# Patient Record
Sex: FEMALE | Race: WHITE | NOT HISPANIC OR LATINO | Employment: OTHER | URBAN - METROPOLITAN AREA
[De-identification: names, ages, dates, MRNs, and addresses within clinical notes are randomized per-mention and may not be internally consistent; named-entity substitution may affect disease eponyms.]

---

## 2017-05-31 ENCOUNTER — THERAPY VISIT (OUTPATIENT)
Dept: PHYSICAL THERAPY | Facility: CLINIC | Age: 62
End: 2017-05-31
Payer: MEDICARE

## 2017-05-31 DIAGNOSIS — G89.29 CHRONIC BILATERAL LOW BACK PAIN WITHOUT SCIATICA: Primary | ICD-10-CM

## 2017-05-31 DIAGNOSIS — M54.50 CHRONIC BILATERAL LOW BACK PAIN WITHOUT SCIATICA: Primary | ICD-10-CM

## 2017-05-31 PROCEDURE — G8979 MOBILITY GOAL STATUS: HCPCS | Mod: GP | Performed by: PHYSICAL THERAPIST

## 2017-05-31 PROCEDURE — 97112 NEUROMUSCULAR REEDUCATION: CPT | Mod: GP | Performed by: PHYSICAL THERAPIST

## 2017-05-31 PROCEDURE — 97110 THERAPEUTIC EXERCISES: CPT | Mod: GP | Performed by: PHYSICAL THERAPIST

## 2017-05-31 PROCEDURE — 97161 PT EVAL LOW COMPLEX 20 MIN: CPT | Mod: GP | Performed by: PHYSICAL THERAPIST

## 2017-05-31 PROCEDURE — G8978 MOBILITY CURRENT STATUS: HCPCS | Mod: GP | Performed by: PHYSICAL THERAPIST

## 2017-05-31 NOTE — LETTER
DEPARTMENT OF HEALTH AND HUMAN SERVICES  CENTERS FOR MEDICARE & MEDICAID SERVICES    PLAN/UPDATED PLAN OF PROGRESS FOR OUTPATIENT REHABILITATION    PATIENTS NAME:  Estrella Arthur   : 1955    PROVIDER NUMBER:    [id number]  [id number]     PROVIDER NAME: [place] [facility]  MEDICAL RECORD NUMBER: [id number]     START OF CARE DATE:  SOC Date: 17   TYPE:  PT    PRIMARY/TREATMENT DIAGNOSIS: (Pertinent Medical Diagnosis)  Chronic bilateral low back pain without sciatica  VISITS FROM START OF CARE:  Rxs Used: 1     [place] [facility] Initial Evaluation -- Lumbar  Date: May 31, 2017  Estrella Arthur is a 61 year old female with a lumbar spine condition.   Referral: GP  Work mechanical stresses:   for granddaughter  Employment status:  1-2 days/week  Leisure mechanical stresses: Sedentary, no formal exercise  Functional disability score (OPAL/STarT Back):  See OPAL/Brittny flows  VAS score (0-10): 5/10  Patient goals/expectations:  Manage pain  HISTORY:  Present symptoms: (B) low back/lateral hips  Pain quality (sharp/shooting/stabbing/aching/burning/cramping):  Achy/sharp   Paresthesia (yes/no):  No  Present since (onset date): 4 months (2017).     Symptoms (improving/unchanging/worsening):  Improving but recently unchanging.   Symptoms commenced as a result of: Fell down flight of stairs   Condition occurred in the following environment:   Home   Symptoms at onset (back/thigh/leg): Back  Constant symptoms (back/thigh/leg): None  Intermittent symptoms (back/thigh/leg): Back  Symptoms are made worse with the following:  Always Bending,  Always Rising,  Always Standing and Time of day -  No effect  Symptoms are made better with the following:  Always Walking and  Always On the move  Disturbed sleep (yes/no):  Yes   Sleeping postures (prone/sup/side R/L): Supine  Previous episodes (0/1-5/6-10/11+): 2   Year of first episode:   Previous history:  Yes  Previous treatments: PT, epidural injections with previous episodes  PATIENTS NAME:  Estrella Arthur   : 1955  PRIMARY/TREATMENT DIAGNOSIS: (Pertinent Medical Diagnosis)  Chronic bilateral low back pain without sciatica    Specific Questions:  Cough/Sneeze/Strain (pos/neg): Pos  Bowel/Bladder (normal/abnormal): Normal  Gait (normal/abnormal): Normal  Medications (nil/NSAIDS/analg/steroids/anticoag/other):  Thyroid, bone density, neurontin  Medical allergies:  None  General health (excellent/good/fair/poor):  Fair  Pertinent medical history:  Thyroid problems, migraines/headaches, Smoking  Imaging (NA/Xray/MRI): X-ray, MRI   Recent or major surgery (yes/no):  None  Night pain (yes/no): No  Accidents (yes/no): No  Unexplained weight loss (yes/no): No  Barriers at home: None  Other red flags: None    EXAMINATION    Posture:   Sitting (good/fair/poor): Fair  Standing (good/fair/poor):  Fair  Lordosis (red/acc/normal): Reduced  Correction of posture (better/worse/no effect): Better  Lateral Shift (right/left/nil): Nil  Relevant (yes/no):  No  Other Observations: NA    Neurological:    Motor deficit:  NA  Reflexes: NA   Sensory deficit: NA   Dural signs:  Neg slump (B)    Movement Loss:   Gallo Mod Min Nil Pain   Flexion    X Low back   Extension  X      Side Gliding R   X  Low back   Side Gliding L   X  Low back     Test Movements:   During: produces, abolishes, increases, decreases, no effect, centralizing, peripheralizing   After: better, worse, no better, no worse, no effect, centralized, peripheralized      PATIENTS NAME:  Estrella Arthur   : 1955  PRIMARY/TREATMENT DIAGNOSIS: (Pertinent Medical Diagnosis)  Chronic bilateral low back pain without sciatica    Pretest symptoms standin/10 pain   Symptoms During Symptoms After ROM increased ROM decreased No Effect   FIS        Rep FIS        EIS        Rep EIS        Pretest symptoms lying:  (B) low back   Symptoms During Symptoms After ROM increased ROM  decreased No Effect   TYRESE        Rep TYRESE        EIL Increases No Worse      Rep EIL Decreases Better X     If required, pretest symptoms:    Symptoms During Symptoms After ROM increased ROM decreased No Effect   SGIS - R        Rep SGIS - R        SGIS - L        Rep SGIS - L          Static Tests:  Sitting slouched:    Sitting erect:    Standing slouched   Standing erect:    Lying prone in extension:   Long sitting:    Other Tests: NA  Provisional Classification:  Derangement - Bilateral, Symmetrical, Sx above knee  Principle of Management:  Education: Centralization, specificity of exercise, posture  Equipment provided:  None, use of rolled towel at home for posture correction  Mechanical therapy (Y/N):  Y   Extension principle: Press ups x 10 reps every 2 hrs    ASSESSMENT/PLAN:  Patient is a 61 year old female with lumbar complaints.    Patient has the following significant findings with corresponding treatment plan.                Diagnosis 1:  LBP  Pain -  self management, education, directional preference exercise and home program  Decreased ROM/flexibility - manual therapy, therapeutic exercise and home program  Decreased joint mobility - manual therapy, therapeutic exercise and home program  Decreased strength - therapeutic exercise, therapeutic activities and home program  Impaired muscle performance - neuro re-education and home program  Decreased function - therapeutic activities and home program  Impaired posture - neuro re-education and home program  PATIENTS NAME:  Estrella Arthur   : 1955  PRIMARY/TREATMENT DIAGNOSIS: (Pertinent Medical Diagnosis)  Chronic bilateral low back pain without sciatica    Therapy Evaluation Codes:   1) History comprised of:   Personal factors that impact the plan of care:      None.    Comorbidity factors that impact the plan of care are:      Smoking.     Medications impacting care: Bone density.  2) Examination of Body Systems comprised of:   Body structures and  "functions that impact the plan of care:      Lumbar spine.   Activity limitations that impact the plan of care are:      Bending, Dressing, Sitting, Squatting/kneeling, Standing and Sleeping.  3) Clinical presentation characteristics are:   Stable/Uncomplicated.  4) Decision-Making    Moderate complexity using standardized patient assessment instrument and/or measureable assessment of functional outcome.  Cumulative Therapy Evaluation is: Low complexity.    Previous and current functional limitations:  (See Goal Flow Sheet for this information)    Short term and Long term goals: (See Goal Flow Sheet for this information)   Communication ability:  Patient appears to be able to clearly communicate and understand verbal and written communication and follow directions correctly.  Treatment Explanation - The following has been discussed with the patient:   RX ordered/plan of care, Anticipated outcomes, Possible risks and side effects                                          PATIENTS NAME:  Estrella Arthur   : 1955  PRIMARY/TREATMENT DIAGNOSIS: (Pertinent Medical Diagnosis)  Chronic bilateral low back pain without sciatica    This patient would benefit from PT intervention to resume normal activities.   Rehab potential is good.  Frequency:  1 X week, once daily  Duration:  for 6 weeks  Discharge Plan:  Achieve all LTG.  Independent in home treatment program.  Reach maximal therapeutic benefit.          Caregiver Signature/Credentials _____________________________ Date ________          Davis Mtz DPT, Cert MDT     I have reviewed and certified the need for these services and plan of treatment while under my care.        PHYSICIAN'S SIGNATURE:   _________________________________________     Date___________   Jessica Lanier    Certification period:  Beginning of Cert date period: 17 to   17     Functional Level Progress Report: Please see attached \"Goal Flow sheet for Functional level.\"    ____X____ " Continue Services or       ________ DC Services                Service dates: From  SOC Date: 05/31/17  to present

## 2017-05-31 NOTE — PROGRESS NOTES
[place] [facility] Initial Evaluation -- Lumbar    Date: May 31, 2017  Estrella Arthur is a 61 year old female with a lumbar spine condition.   Referral: GP  Work mechanical stresses:   for granddaughter  Employment status:  1-2 days/week  Leisure mechanical stresses: Sedentary, no formal exercise  Functional disability score (OPAL/STarT Back):  See OPAL/Brittny flows  VAS score (0-10): 5/10  Patient goals/expectations:  Manage pain    HISTORY:    Present symptoms: (B) low back/lateral hips  Pain quality (sharp/shooting/stabbing/aching/burning/cramping):  Achy/sharp   Paresthesia (yes/no):  No    Present since (onset date): 4 months (Jan 2017).   Symptoms (improving/unchanging/worsening):  Improving but recently unchanging.   Symptoms commenced as a result of: Fell down flight of stairs   Condition occurred in the following environment:   Home     Symptoms at onset (back/thigh/leg): Back  Constant symptoms (back/thigh/leg): None  Intermittent symptoms (back/thigh/leg): Back    Symptoms are made worse with the following:  Always Bending,  Always Rising,  Always Standing and Time of day -  No effect  Symptoms are made better with the following:  Always Walking and  Always On the move    Disturbed sleep (yes/no):  Yes Sleeping postures (prone/sup/side R/L): Supine    Previous episodes (0/1-5/6-10/11+): 2 Year of first episode: 2011    Previous history: Yes  Previous treatments: PT, epidural injections with previous episodes      Specific Questions:  Cough/Sneeze/Strain (pos/neg): Pos  Bowel/Bladder (normal/abnormal): Normal  Gait (normal/abnormal): Normal  Medications (nil/NSAIDS/analg/steroids/anticoag/other):  Thyroid, bone density, neurontin  Medical allergies:  None  General health (excellent/good/fair/poor):  Fair  Pertinent medical history:  Thyroid problems, migraines/headaches, Smoking  Imaging (NA/Xray/MRI): X-ray, MRI   Recent or major surgery (yes/no):  None  Night pain (yes/no): No  Accidents  (yes/no): No  Unexplained weight loss (yes/no): No  Barriers at home: None  Other red flags: None    EXAMINATION    Posture:   Sitting (good/fair/poor): Fair  Standing (good/fair/poor):  Fair  Lordosis (red/acc/normal): Reduced  Correction of posture (better/worse/no effect): Better    Lateral Shift (right/left/nil): Nil  Relevant (yes/no):  No  Other Observations: NA    Neurological:    Motor deficit:  NA  Reflexes: NA   Sensory deficit: NA   Dural signs:  Neg slump (B)    Movement Loss:   Gallo Mod Min Nil Pain   Flexion    X Low back   Extension  X      Side Gliding R   X  Low back   Side Gliding L   X  Low back     Test Movements:   During: produces, abolishes, increases, decreases, no effect, centralizing, peripheralizing   After: better, worse, no better, no worse, no effect, centralized, peripheralized    Pretest symptoms standin/10 pain   Symptoms During Symptoms After ROM increased ROM decreased No Effect   FIS        Rep FIS        EIS        Rep EIS        Pretest symptoms lying:  (B) low back   Symptoms During Symptoms After ROM increased ROM decreased No Effect   TYRESE        Rep TYRESE        EIL Increases No Worse      Rep EIL Decreases Better X     If required, pretest symptoms:    Symptoms During Symptoms After ROM increased ROM decreased No Effect   SGIS - R        Rep SGIS - R        SGIS - L        Rep SGIS - L          Static Tests:  Sitting slouched:    Sitting erect:    Standing slouched   Standing erect:    Lying prone in extension:   Long sitting:      Other Tests: NA    Provisional Classification:  Derangement - Bilateral, Symmetrical, Sx above knee    Principle of Management:  Education: Centralization, specificity of exercise, posture  Equipment provided:  None, use of rolled towel at home for posture correction  Mechanical therapy (Y/N):  Y   Extension principle: Press ups x 10 reps every 2 hrs      ASSESSMENT/PLAN:    Patient is a 61 year old female with lumbar complaints.    Patient has  the following significant findings with corresponding treatment plan.                Diagnosis 1:  LBP    Pain -  self management, education, directional preference exercise and home program  Decreased ROM/flexibility - manual therapy, therapeutic exercise and home program  Decreased joint mobility - manual therapy, therapeutic exercise and home program  Decreased strength - therapeutic exercise, therapeutic activities and home program  Impaired muscle performance - neuro re-education and home program  Decreased function - therapeutic activities and home program  Impaired posture - neuro re-education and home program    Therapy Evaluation Codes:   1) History comprised of:   Personal factors that impact the plan of care:      None.    Comorbidity factors that impact the plan of care are:      Smoking.     Medications impacting care: Bone density.  2) Examination of Body Systems comprised of:   Body structures and functions that impact the plan of care:      Lumbar spine.   Activity limitations that impact the plan of care are:      Bending, Dressing, Sitting, Squatting/kneeling, Standing and Sleeping.  3) Clinical presentation characteristics are:   Stable/Uncomplicated.  4) Decision-Making    Moderate complexity using standardized patient assessment instrument and/or measureable assessment of functional outcome.  Cumulative Therapy Evaluation is: Low complexity.    Previous and current functional limitations:  (See Goal Flow Sheet for this information)    Short term and Long term goals: (See Goal Flow Sheet for this information)     Communication ability:  Patient appears to be able to clearly communicate and understand verbal and written communication and follow directions correctly.  Treatment Explanation - The following has been discussed with the patient:   RX ordered/plan of care  Anticipated outcomes  Possible risks and side effects  This patient would benefit from PT intervention to resume normal activities.    Rehab potential is good.    Frequency:  1 X week, once daily  Duration:  for 6 weeks  Discharge Plan:  Achieve all LTG.  Independent in home treatment program.  Reach maximal therapeutic benefit.    Please refer to the daily flowsheet for treatment today, total treatment time and time spent performing 1:1 timed codes.

## 2017-06-01 PROBLEM — G89.29 CHRONIC BILATERAL LOW BACK PAIN WITHOUT SCIATICA: Status: ACTIVE | Noted: 2017-06-01

## 2017-06-01 PROBLEM — M54.50 CHRONIC BILATERAL LOW BACK PAIN WITHOUT SCIATICA: Status: ACTIVE | Noted: 2017-06-01

## 2017-07-26 PROBLEM — M54.50 CHRONIC BILATERAL LOW BACK PAIN WITHOUT SCIATICA: Status: RESOLVED | Noted: 2017-06-01 | Resolved: 2017-07-26

## 2017-07-26 PROBLEM — G89.29 CHRONIC BILATERAL LOW BACK PAIN WITHOUT SCIATICA: Status: RESOLVED | Noted: 2017-06-01 | Resolved: 2017-07-26

## 2017-07-26 NOTE — PROGRESS NOTES
Patient only seen for initial visit.  Did not return for follow up so current status unknown.  D/C PT with initial eval to serve as D/C status.

## 2017-09-17 ENCOUNTER — HEALTH MAINTENANCE LETTER (OUTPATIENT)
Age: 62
End: 2017-09-17

## 2017-12-27 ENCOUNTER — THERAPY VISIT (OUTPATIENT)
Dept: PHYSICAL THERAPY | Facility: CLINIC | Age: 62
End: 2017-12-27
Payer: MEDICARE

## 2017-12-27 DIAGNOSIS — M54.42 CHRONIC BILATERAL LOW BACK PAIN WITH LEFT-SIDED SCIATICA: Primary | ICD-10-CM

## 2017-12-27 DIAGNOSIS — G89.29 CHRONIC BILATERAL LOW BACK PAIN WITH LEFT-SIDED SCIATICA: Primary | ICD-10-CM

## 2017-12-27 PROCEDURE — 97163 PT EVAL HIGH COMPLEX 45 MIN: CPT | Mod: GP | Performed by: PHYSICAL THERAPIST

## 2017-12-27 PROCEDURE — 97110 THERAPEUTIC EXERCISES: CPT | Mod: GP | Performed by: PHYSICAL THERAPIST

## 2017-12-27 PROCEDURE — G8979 MOBILITY GOAL STATUS: HCPCS | Mod: GP | Performed by: PHYSICAL THERAPIST

## 2017-12-27 PROCEDURE — G8978 MOBILITY CURRENT STATUS: HCPCS | Mod: GP | Performed by: PHYSICAL THERAPIST

## 2017-12-27 PROCEDURE — 97112 NEUROMUSCULAR REEDUCATION: CPT | Mod: GP | Performed by: PHYSICAL THERAPIST

## 2017-12-27 NOTE — LETTER
DEPARTMENT OF HEALTH AND HUMAN SERVICES  CENTERS FOR MEDICARE & MEDICAID SERVICES    PLAN/UPDATED PLAN OF PROGRESS FOR OUTPATIENT REHABILITATION    PATIENTS NAME:  Estrella Arthur   : 1955    PR[facility] NUMBER:    [id number]  [id number]     PROVIDER NAME: [place] [facility]  MEDICAL RECORD NUMBER: [id number]     START OF CARE DATE:  SOC Date: 17   TYPE:  PT    PRIMARY/TREATMENT DIAGNOSIS: (Pertinent Medical Diagnosis)  Chronic bilateral low back pain with left-sided sciatica    VISITS FROM START OF CARE:  1     [place] [facility] Initial Evaluation -- Lumbar  Date: 2017  Estrella Arthur is a 62 year old female with a lumbar spine condition.   Referral: GP  Work mechanical stresses:  NA  Employment status:  Retired  Leisure mechanical stresses: No formal exercise  Functional disability score (OPAL/STarT Back):  See OPAL/STarT Back flow sheets  VAS score (0-10): 8/10  Patient goals/expectations:  Reduce pain in back and improve function to be able to care for grand kids.    HISTORY:  Present symptoms: (B) low back, (L) glut and posterior thigh  Pain quality (sharp/shooting/stabbing/aching/burning/cramping):  achy   Paresthesia (yes/no):  N  Present since (onset date): 2017.  MD referral date 17.     Symptoms (improving/unchanging/worsening):  worsening.   Symptoms commenced as a result of: No apparent reason   Condition occurred in the following environment:   NA   Symptoms at onset (back/thigh/leg): back/thigh  Constant symptoms (back/thigh/leg): back/thigh  Intermittent symptoms (back/thigh/leg): None  Symptoms are made worse with the following: Always Bending, Always Rising, Always Standing, Always Walking, Time of day - Always AM and Always PM and Always On the move   Symptoms are made better with the following: Sometimes When still and Other - heat  Disturbed sleep (yes/no):  Yes   Sleeping postures (prone/sup/side R/L):  prone  Previous episodes (0/1-5/6-10/11+): 2   Year of first episode:   Previous history: Yes  Previous treatments: PT  PATIENTS NAME:  Estrella Arthur   : 1955  PRIMARY/TREATMENT DIAGNOSIS: (Pertinent Medical Diagnosis)  Chronic bilateral low back pain with left-sided sciatica    Specific Questions:  Cough/Sneeze/Strain (pos/neg): Pos  Bowel/Bladder (normal/abnormal): Normal  Gait (normal/abnormal): Normal  Medications (nil/NSAIDS/analg/steroids/anticoag/other):  Narcotics/Opiods and Other - Thyroid, Anti-depressants and gabapentin  Medical allergies:  Codeine, Ibuprofen, Cyclobenzaprine  General health (excellent/good/fair/poor):  Good  Pertinent medical history:  Osteoporosis, Depression, Thyroid problems, Smoking and COPD  Imaging (None/Xray/MRI/Other):  None  Recent or major surgery (yes/no):  No  Night pain (yes/no): No  Accidents (yes/no): No  Unexplained weight loss (yes/no): No  Barriers at home: None  Other red flags: None    EXAMINATION    Posture:   Sitting (good/fair/poor): Fair  Standing (good/fair/poor):Fair  Lordosis (red/acc/normal): Reduced  Correction of posture (better/worse/no effect): Better  Lateral Shift (right/left/nil): Nil  Relevant (yes/no):  No  Other Observations: NA    Neurological:  Motor deficit:  WNL  Reflexes:  NT  Sensory deficit:  NT  Dural signs:  Pos slump (L)    Movement Loss:   Gallo Mod Min Nil Pain   Flexion   X  (B) low back/thigh   Extension  X   (B) low back/thigh   Side Gliding R   X  (B) low back/thigh   Side Gliding L   X  (B) low back/thigh     Test Movements:   During: produces, abolishes, increases, decreases, no effect, centralizing, peripheralizing   After: better, worse, no better, no worse, no effect, centralized, peripheralized            PATIENTS NAME:  Estrella Arthur   : 1955  PRIMARY/TREATMENT DIAGNOSIS: (Pertinent Medical Diagnosis)  Chronic bilateral low back pain with left-sided sciatica    Pretest symptoms standing: (B) low back, (L)  thigh   Symptoms During Symptoms After ROM increased ROM decreased No Effect   FIS        Rep FIS        EIS No Effect    No Effect         Rep EIS Decreases    Better    X     Pretest symptoms lying: (B) low back/(L) thigh    Symptoms During Symptoms After ROM increased ROM decreased No Effect   TYRESE        Rep TYRESE        EIL No Effect    No Effect         Rep EIL Decreases    Better X     If required, pretest symptoms:    Symptoms During Symptoms After ROM increased ROM decreased No Effect   SGIS - R        Rep SGIS - R        SGIS - L        Rep SGIS - L          Static Tests:  Sitting slouched:    Sitting erect:    Standing slouched   Standing erect:    Lying prone in extension:   Long sitting:    Other Tests:   Provisional Classification:  Derangement - Asymmetrical, unilateral, symptoms above knee    Principle of Management:  Education:  Posture, centralization     Equipment provided:  None.  Use of rolled towel for posture correction  Mechanical therapy (Y/N):  Y   Extension principle:  EIS/EIL x 10-15 reps every 2-3 hrs    Lateral Principle:    Flexion principle:      Other:                PATIENTS NAME:  Estrella Arthur   : 1955  PRIMARY/TREATMENT DIAGNOSIS: (Pertinent Medical Diagnosis)  Chronic bilateral low back pain with left-sided sciatica    ASSESSMENT/PLAN:  Patient is a 62 year old female with lumbar complaints.    Patient has the following significant findings with corresponding treatment plan.                Diagnosis 1:  LBP  Pain -  self management, education, directional preference exercise and home program  Decreased ROM/flexibility - manual therapy, therapeutic exercise and home program  Decreased joint mobility - manual therapy, therapeutic exercise and home program  Decreased strength - therapeutic exercise, therapeutic activities and home program  Impaired muscle performance - neuro re-education and home program  Decreased function - therapeutic activities and home program  Impaired  posture - neuro re-education and home program    Therapy Evaluation Codes:   1) History comprised of:   Personal factors that impact the plan of care:      None.    Comorbidity factors that impact the plan of care are:      Depression and Smoking.     Medications impacting care: Anti-depressant and Pain.  2) Examination of Body Systems comprised of:   Body structures and functions that impact the plan of care:      Lumbar spine.   Activity limitations that impact the plan of care are:      Bending, Sitting, Standing and Walking.  3) Clinical presentation characteristics are:   Evolving/Changing.  4) Decision-Making    Moderate complexity using standardized patient assessment instrument and/or measureable assessment of functional outcome.  Cumulative Therapy Evaluation is: Moderate complexity.    Previous and current functional limitations:  (See Goal Flow Sheet for this information)    Short term and Long term goals: (See Goal Flow Sheet for this information)   Communication ability:  Patient appears to be able to clearly communicate and understand verbal and written communication and follow directions correctly.  Treatment Explanation - The following has been discussed with the patient:   RX ordered/plan of care, Anticipated outcomes, Possible risks and side effects                        PATIENTS NAME:  Estrella Arthur   : 1955  PRIMARY/TREATMENT DIAGNOSIS: (Pertinent Medical Diagnosis)  Chronic bilateral low back pain with left-sided sciatica    This patient would benefit from PT intervention to resume normal activities.   Rehab potential is good.  Frequency:  1 X week, once daily  Duration:  for 6 weeks  Discharge Plan:  Achieve all LTG.  Independent in home treatment program.  Reach maximal therapeutic benefit.          Caregiver Signature/Credentials _____________________________ Date ________          Davis Mtz DPT, Cert MDT     I have reviewed and certified the need for these services and plan of  "treatment while under my care.        PHYSICIAN'S SIGNATURE:   _________________________________________    Date___________   Jessica Lanier PA-C    Certification period:  Beginning of Cert date period: 12/27/17 to  03/26/18     Functional Level Progress Report: Please see attached \"Goal Flow sheet for Functional level.\"    ____X____ Continue Services or       ________ DC Services                Service dates: From  SOC Date: 12/27/17  to present                         "

## 2017-12-27 NOTE — PROGRESS NOTES
[facility] Initial Evaluation -- Lumbar    Date: December 27, 2017  Estrella Arthur is a 62 year old female with a lumbar spine condition.   Referral: GP  Work mechanical stresses:  NA  Employment status:  Retired  Leisure mechanical stresses: No formal exercise  Functional disability score (OPAL/STarT Back):  See OPAL/STarT Back flow sheets  VAS score (0-10): 8/10  Patient goals/expectations:  Reduce pain in back and improve function to be able to care for grand kids.    HISTORY:    Present symptoms: (B) low back, (L) glut and posterior thigh  Pain quality (sharp/shooting/stabbing/aching/burning/cramping):  achy   Paresthesia (yes/no):  No    Present since (onset date): Jan 2017.  MD referral date 11.9.17.     Symptoms (improving/unchanging/worsening):  worsening.     Symptoms commenced as a result of: No apparent reason   Condition occurred in the following environment:   NA     Symptoms at onset (back/thigh/leg): back/thigh  Constant symptoms (back/thigh/leg): back/thigh  Intermittent symptoms (back/thigh/leg): None    Symptoms are made worse with the following: Always Bending, Always Rising, Always Standing, Always Walking, Time of day - Always AM and Always PM and Always On the move   Symptoms are made better with the following: Sometimes When still and Other - heat    Disturbed sleep (yes/no):  Yes Sleeping postures (prone/sup/side R/L): prone    Previous episodes (0/1-5/6-10/11+): 2 Year of first episode: 2011    Previous history: Yes  Previous treatments: PT      Specific Questions:  Cough/Sneeze/Strain (pos/neg): Pos  Bowel/Bladder (normal/abnormal): Normal  Gait (normal/abnormal): Normal  Medications (nil/NSAIDS/analg/steroids/anticoag/other):  Narcotics/Opiods and Other - Thyroid, Anti-depressants and gabapentin  Medical allergies:  Codeine, Ibuprofen, Cyclobenzaprine  General health (excellent/good/fair/poor):  Good  Pertinent medical history:  Osteoporosis, Depression, Thyroid problems,  Smoking and COPD  Imaging (None/Xray/MRI/Other):  None  Recent or major surgery (yes/no):  No  Night pain (yes/no): No  Accidents (yes/no): No  Unexplained weight loss (yes/no): No  Barriers at home: None  Other red flags: None    EXAMINATION    Posture:   Sitting (good/fair/poor): Fair  Standing (good/fair/poor):Fair  Lordosis (red/acc/normal): Reduced  Correction of posture (better/worse/no effect): Better    Lateral Shift (right/left/nil): Nil  Relevant (yes/no):  No  Other Observations: NA    Neurological:    Motor deficit:  WNL  Reflexes:  NT  Sensory deficit:  NT  Dural signs:  Pos slump (L)    Movement Loss:   Gallo Mod Min Nil Pain   Flexion   X  (B) low back/thigh   Extension  X   (B) low back/thigh   Side Gliding R   X  (B) low back/thigh   Side Gliding L   X  (B) low back/thigh     Test Movements:   During: produces, abolishes, increases, decreases, no effect, centralizing, peripheralizing   After: better, worse, no better, no worse, no effect, centralized, peripheralized    Pretest symptoms standing: (B) low back, (L) thigh   Symptoms During Symptoms After ROM increased ROM decreased No Effect   FIS        Rep FIS        EIS No Effect    No Effect         Rep EIS Decreases    Better    X     Pretest symptoms lying: (B) low back/(L) thigh    Symptoms During Symptoms After ROM increased ROM decreased No Effect   TYRESE        Rep TYRESE        EIL No Effect    No Effect         Rep EIL Decreases    Better X     If required, pretest symptoms:    Symptoms During Symptoms After ROM increased ROM decreased No Effect   SGIS - R        Rep SGIS - R        SGIS - L        Rep SGIS - L          Static Tests:  Sitting slouched:    Sitting erect:    Standing slouched   Standing erect:    Lying prone in extension:   Long sitting:      Other Tests:     Provisional Classification:  Derangement - Asymmetrical, unilateral, symptoms above knee    Principle of Management:  Education:  Posture, centralization   Equipment provided:   None.  Use of rolled towel for posture correction  Mechanical therapy (Y/N):  Y   Extension principle:  EIS/EIL x 10-15 reps every 2-3 hrs  Lateral Principle:    Flexion principle:    Other:      ASSESSMENT/PLAN:    Patient is a 62 year old female with lumbar complaints.    Patient has the following significant findings with corresponding treatment plan.                Diagnosis 1:  LBP  Pain -  self management, education, directional preference exercise and home program  Decreased ROM/flexibility - manual therapy, therapeutic exercise and home program  Decreased joint mobility - manual therapy, therapeutic exercise and home program  Decreased strength - therapeutic exercise, therapeutic activities and home program  Impaired muscle performance - neuro re-education and home program  Decreased function - therapeutic activities and home program  Impaired posture - neuro re-education and home program    Therapy Evaluation Codes:   1) History comprised of:   Personal factors that impact the plan of care:      None.    Comorbidity factors that impact the plan of care are:      Depression and Smoking.     Medications impacting care: Anti-depressant and Pain.  2) Examination of Body Systems comprised of:   Body structures and functions that impact the plan of care:      Lumbar spine.   Activity limitations that impact the plan of care are:      Bending, Sitting, Standing and Walking.  3) Clinical presentation characteristics are:   Evolving/Changing.  4) Decision-Making    Moderate complexity using standardized patient assessment instrument and/or measureable assessment of functional outcome.  Cumulative Therapy Evaluation is: Moderate complexity.    Previous and current functional limitations:  (See Goal Flow Sheet for this information)    Short term and Long term goals: (See Goal Flow Sheet for this information)     Communication ability:  Patient appears to be able to clearly communicate and understand verbal and written  communication and follow directions correctly.  Treatment Explanation - The following has been discussed with the patient:   RX ordered/plan of care  Anticipated outcomes  Possible risks and side effects  This patient would benefit from PT intervention to resume normal activities.   Rehab potential is good.    Frequency:  1 X week, once daily  Duration:  for 6 weeks  Discharge Plan:  Achieve all LTG.  Independent in home treatment program.  Reach maximal therapeutic benefit.    Please refer to the daily flowsheet for treatment today, total treatment time and time spent performing 1:1 timed codes.

## 2018-01-08 ENCOUNTER — THERAPY VISIT (OUTPATIENT)
Dept: PHYSICAL THERAPY | Facility: CLINIC | Age: 63
End: 2018-01-08
Payer: MEDICARE

## 2018-01-08 DIAGNOSIS — G89.29 CHRONIC BILATERAL LOW BACK PAIN WITH LEFT-SIDED SCIATICA: ICD-10-CM

## 2018-01-08 DIAGNOSIS — M54.42 CHRONIC BILATERAL LOW BACK PAIN WITH LEFT-SIDED SCIATICA: ICD-10-CM

## 2018-01-08 PROCEDURE — 97110 THERAPEUTIC EXERCISES: CPT | Mod: GP | Performed by: PHYSICAL THERAPIST

## 2018-01-23 ENCOUNTER — THERAPY VISIT (OUTPATIENT)
Dept: PHYSICAL THERAPY | Facility: CLINIC | Age: 63
End: 2018-01-23
Payer: MEDICARE

## 2018-01-23 DIAGNOSIS — M54.42 CHRONIC BILATERAL LOW BACK PAIN WITH LEFT-SIDED SCIATICA: ICD-10-CM

## 2018-01-23 DIAGNOSIS — G89.29 CHRONIC BILATERAL LOW BACK PAIN WITH LEFT-SIDED SCIATICA: ICD-10-CM

## 2018-01-23 PROCEDURE — 97110 THERAPEUTIC EXERCISES: CPT | Mod: GP | Performed by: PHYSICAL THERAPIST

## 2018-01-23 PROCEDURE — 97140 MANUAL THERAPY 1/> REGIONS: CPT | Mod: GP | Performed by: PHYSICAL THERAPIST

## 2018-06-25 PROBLEM — M54.42 CHRONIC BILATERAL LOW BACK PAIN WITH LEFT-SIDED SCIATICA: Status: RESOLVED | Noted: 2017-12-27 | Resolved: 2018-06-25

## 2018-06-25 PROBLEM — G89.29 CHRONIC BILATERAL LOW BACK PAIN WITH LEFT-SIDED SCIATICA: Status: RESOLVED | Noted: 2017-12-27 | Resolved: 2018-06-25

## 2019-11-11 ENCOUNTER — THERAPY VISIT (OUTPATIENT)
Dept: OCCUPATIONAL THERAPY | Facility: CLINIC | Age: 64
End: 2019-11-11
Payer: COMMERCIAL

## 2019-11-11 DIAGNOSIS — M79.641 PAIN OF RIGHT HAND: Primary | ICD-10-CM

## 2019-11-11 DIAGNOSIS — G56.01 CARPAL TUNNEL SYNDROME OF RIGHT WRIST: ICD-10-CM

## 2019-11-11 PROCEDURE — 97110 THERAPEUTIC EXERCISES: CPT | Mod: GO | Performed by: OCCUPATIONAL THERAPIST

## 2019-11-11 PROCEDURE — 97140 MANUAL THERAPY 1/> REGIONS: CPT | Mod: GO | Performed by: OCCUPATIONAL THERAPIST

## 2019-11-11 PROCEDURE — 97165 OT EVAL LOW COMPLEX 30 MIN: CPT | Mod: GO | Performed by: OCCUPATIONAL THERAPIST

## 2019-11-11 NOTE — LETTER
[facility] [place]  [address]  [place] [address]  [phone]    2019    Re: Estrella Arthur   :   1955  MRN:  [id number]   REFERRING PHYSICIAN:   Glory Jarquin    [facility] [place]    Date of Initial Evaluation:  19  Visits:  Rxs Used: 1  Reason for Referral:     Pain of right hand  Carpal tunnel syndrome of right wrist    EVALUATION SUMMARY    Hand Therapy Initial Evaluation    Current Date:  10/11/2019    Subjective:  Estrella Arthur is a 64 year old right hand dominant female.    Diagnosis:   Right wrist pain/CTS  DOI:  19 (therapy referral)  DOS: 2019 (pt does not recall exact date)  Procedure: Right CTR    Patient reports symptoms of pain, stiffness/loss of motion, weakness/loss of strength, and numbness and tingling of the right wrist and hand which occurred due to gradual onset over the past 6 years with unknown etiology. Since onset symptoms gradually became worse.  Special tests:  none.  Previous treatment: None.  General health as reported by patient is fair.  Pertinent medical history includes:Depression, Migraines/Headaches, Smoking, Thyroid Problems  Medical allergies:see list in EMR.  Surgical history: orthopedic: right CTR.  Medication history: Anti-depressants, Pain, Thyroid.    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)  Currently not working due to present treatment problem  Prior functional level:  independent-have help in some areas  Barriers include:none  Mobility: No difficulty  Transportation: drives but does not own a car, borrows daughter's    Functional Outcome Measure:  Upper Extremity Functional Index  SCORE:   Column Totals: 71/80  (A lower score indicates greater disability.)    Objective:  Pain Level (Scale 0-10)   19   At Rest 0   With Use 9      Pain Description  Date 19   Location wrist and hand   Pain Quality Sharp and Tender   Frequency intermittent     Pain is worst  daytime   Exacerbated by  Weight bearing   Relieved by none   Progression Unchanged      Edema  None in fingers/hand on observation    Scar  Sensitivity: Moderate Quality:  Moderately adherent     Sensation  Decreased Median Nerve distribution which has resolved since surgery, per pt report numbness over scar area at volar wrist    ROM  Wrist 19   AROM(PROM) Left Right   Extension 80 75   Flexion 75 55   RD 30 30   UD 45 35     Strength   (Measured in pounds)  Pain Report: - none  + mild    ++ moderate    +++ severe    19   Trials Left Right   1  2  3 42  35  38 30-  37+  40+   Average 38 36               Estrella Arthur   :   1955        Lat Pinch 19   Trials Left Right   1  2  3 10  8  9 12-  12-  12-   Average 9 12     3 Pt Pinch 19   Trials Left Right   1  2  3 10  7  7 12+  10+  9+   Average 8 10     Assessment:  Patient presents with symptoms consistent with diagnosis of CTS, with surgical  intervention.     Patient's limitations or Problem List includes:  Pain, Decreased ROM/motion, Weakness, Sensory disturbance, Adherent scarring and Decreased  of the right wrist and hand which interferes with the patient's ability to perform Self Care Tasks (bathing), Work Tasks and Household Chores as compared to previous level of function.    Rehab Potential:  Excellent - Return to full activity, no limitations    Patient will benefit from skilled Occupational Therapy to increase ROM, flexibility, overall strength,  strength and sensation and decrease pain and adherence of scarring to return to previous activity level and resume normal daily tasks and to reach their rehab potential.    Barriers to Learning:  No barrier    Communication Issues:  Patient appears to be able to clearly communicate and understand  verbal and written communication and follow directions correctly.    Chart Review: Chart Review and Simple history review with patient    Identified Performance Deficits: bathing/showering and home establishment and management    Assessment of Occupational Performance:  1-3 Performance Deficits    Clinical Decision Making (Complexity): Low complexity    Treatment Explanation:  The following has been discussed with the patient:  RX ordered/plan of care  Anticipated outcomes  Possible risks and side effects    Estrella Arthur   :   1955        Plan:  Frequency:  1 X week, once daily  Duration:  for 6 weeks    Treatment Plan:    Modalities:    US   Therapeutic Exercise:    AROM, PROM, Tendon Gliding and Isotonics  Neuromuscular re-ed:   Nerve Gliding and Kinesiotaping  Manual Techniques:   Scar mobilization  Self Care:    Self Care Tasks and Ergonomic Considerations    Discharge Plan:  Achieve all LTG.  Independent in home treatment program.  Reach maximal therapeutic benefit.    Home Exercise Program:  Warmth for stiffness  Scar massage,3 vector  AROM wrist E/F and R/U  Tendon gliding with 3 fists and FDS  Distal median nerve gliding   strengthening with foam wedge  Wean neoprene wrist wrap as able    Next Visit:  See in 2 weeks  Assess response to HEP  Consider US and scar pad for scar softening     Thank you for your referral.    INQUIRIES  Therapist: CHANDA De La Cruz/JAKY, CHT  [facility] [place]  [address]  [place] [address]  Phone: [phone]  Fax: [phone]

## 2019-11-11 NOTE — PROGRESS NOTES
Hand Therapy Initial Evaluation    Current Date:  10/11/2019    Subjective:  Estrella Arthur is a 64 year old right hand dominant female.    Diagnosis:   Right wrist pain/CTS  DOI:  9/30/19 (therapy referral)  DOS: August 2019 (pt does not recall exact date)  Procedure: Right CTR    Patient reports symptoms of pain, stiffness/loss of motion, weakness/loss of strength, and numbness and tingling of the right wrist and hand which occurred due to gradual onset over the past 6 years with unknown etiology. Since onset symptoms gradually became worse.  Special tests:  none.  Previous treatment: None.  General health as reported by patient is fair.  Pertinent medical history includes:Depression, Migraines/Headaches, Smoking, Thyroid Problems  Medical allergies:see list in EMR.  Surgical history: orthopedic: right CTR.  Medication history: Anti-depressants, Pain, Thyroid.    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)  Currently not working due to present treatment problem  Prior functional level:  independent-have help in some areas  Barriers include:none  Mobility: No difficulty  Transportation: drives but does not own a car, borrows daughter's    Functional Outcome Measure:  Upper Extremity Functional Index  SCORE:   Column Totals: 71/80  (A lower score indicates greater disability.)    Objective:  Pain Level (Scale 0-10)   11/11/19   At Rest 0   With Use 9     Pain Description  Date 11/11/19   Location wrist and hand   Pain Quality Sharp and Tender   Frequency intermittent     Pain is worst  daytime   Exacerbated by  Weight bearing   Relieved by none   Progression Unchanged      Edema  None in fingers/hand on observation    Scar  Sensitivity: Moderate Quality:  Moderately adherent     Sensation  Decreased Median Nerve distribution which has resolved since surgery, per pt report numbness over scar area at volar wrist    ROM  Wrist  11/11/19 11/11/19   AROM(PROM) Left Right   Extension 80 75   Flexion 75 55   RD 30 30   UD 45 35     Strength   (Measured in pounds)  Pain Report: - none  + mild    ++ moderate    +++ severe    11/11/19 11/11/19   Trials Left Right   1  2  3 42  35  38 30-  37+  40+   Average 38 36     Lat Pinch 11/11/19 11/11/19   Trials Left Right   1  2  3 10  8  9 12-  12-  12-   Average 9 12     3 Pt Pinch 11/11/19 11/11/19   Trials Left Right   1  2  3 10  7  7 12+  10+  9+   Average 8 10     Assessment:  Patient presents with symptoms consistent with diagnosis of CTS, with surgical  intervention.     Patient's limitations or Problem List includes:  Pain, Decreased ROM/motion, Weakness, Sensory disturbance, Adherent scarring and Decreased  of the right wrist and hand which interferes with the patient's ability to perform Self Care Tasks (bathing), Work Tasks and Household Chores as compared to previous level of function.    Rehab Potential:  Excellent - Return to full activity, no limitations    Patient will benefit from skilled Occupational Therapy to increase ROM, flexibility, overall strength,  strength and sensation and decrease pain and adherence of scarring to return to previous activity level and resume normal daily tasks and to reach their rehab potential.    Barriers to Learning:  No barrier    Communication Issues:  Patient appears to be able to clearly communicate and understand verbal and written communication and follow directions correctly.    Chart Review: Chart Review and Simple history review with patient    Identified Performance Deficits: bathing/showering and home establishment and management    Assessment of Occupational Performance:  1-3 Performance Deficits    Clinical Decision Making (Complexity): Low complexity    Treatment Explanation:  The following has been discussed with the patient:  RX ordered/plan of care  Anticipated outcomes  Possible risks and side effects    Plan:  Frequency:  1  X week, once daily  Duration:  for 6 weeks    Treatment Plan:    Modalities:    US   Therapeutic Exercise:    AROM, PROM, Tendon Gliding and Isotonics  Neuromuscular re-ed:   Nerve Gliding and Kinesiotaping  Manual Techniques:   Scar mobilization  Self Care:    Self Care Tasks and Ergonomic Considerations    Discharge Plan:  Achieve all LTG.  Independent in home treatment program.  Reach maximal therapeutic benefit.    Home Exercise Program:  Warmth for stiffness  Scar massage,3 vector  AROM wrist E/F and R/U  Tendon gliding with 3 fists and FDS  Distal median nerve gliding   strengthening with foam wedge  Wean neoprene wrist wrap as able    Next Visit:  See in 2 weeks  Assess response to HEP  Consider US and scar pad for scar softening

## 2020-01-13 PROBLEM — G56.01 CARPAL TUNNEL SYNDROME OF RIGHT WRIST: Status: RESOLVED | Noted: 2019-11-11 | Resolved: 2020-01-13

## 2020-01-13 PROBLEM — M79.641 PAIN OF RIGHT HAND: Status: RESOLVED | Noted: 2019-11-11 | Resolved: 2020-01-13

## 2020-02-23 ENCOUNTER — HEALTH MAINTENANCE LETTER (OUTPATIENT)
Age: 65
End: 2020-02-23

## 2020-12-07 ENCOUNTER — THERAPY VISIT (OUTPATIENT)
Dept: PHYSICAL THERAPY | Facility: CLINIC | Age: 65
End: 2020-12-07
Payer: COMMERCIAL

## 2020-12-07 DIAGNOSIS — M54.2 NECK PAIN: ICD-10-CM

## 2020-12-07 DIAGNOSIS — G89.29 CHRONIC BILATERAL LOW BACK PAIN WITHOUT SCIATICA: ICD-10-CM

## 2020-12-07 DIAGNOSIS — M54.50 CHRONIC BILATERAL LOW BACK PAIN WITHOUT SCIATICA: ICD-10-CM

## 2020-12-07 PROCEDURE — 97530 THERAPEUTIC ACTIVITIES: CPT | Mod: GP | Performed by: PHYSICAL THERAPIST

## 2020-12-07 PROCEDURE — 97110 THERAPEUTIC EXERCISES: CPT | Mod: GP | Performed by: PHYSICAL THERAPIST

## 2020-12-07 PROCEDURE — 97163 PT EVAL HIGH COMPLEX 45 MIN: CPT | Mod: GP | Performed by: PHYSICAL THERAPIST

## 2020-12-07 NOTE — PROGRESS NOTES
[facility] Initial Evaluation  Physical Therapy Initial Examination/Evaluation    December 7, 2020    Estrella Arthur  is a 65 year old  female referred to physical therapy by Glory Jarquin MD  for treatment of neck pain, low back pain, hip pain, dizziness.      DOI/onset ~ 4 weeks ago.      Mechanism of injury Chronic.  She has been staying at her daughters and sleeping on in air mattress.  I will also sleep on the couch which seems to help  DOS none  Prior treatment physical therapy. Effect of prior treatment good.      Chief Complaint:   Moving,  I sleep a lot because I don't want to get up.  The weather does not do my depression any justice.   Pain location: middle back and both of the shoulders.  I really just need a good back rub.  The left side is always worse than the right.    Quality: aching  Constant/Intermittent: constant  Time of day: evening  Symptoms have worsened since onset.    Current pain 7/10.  Pain at best 5/10.  Pain at worst 9/10.    Symptoms aggravated by standing (5 min), transfers in out of chair.  Reports no issues with cervical ROM, reading, driving.    Symptoms improved with laying down- faby pills ([facility]).     Social history:  Pt is currently living with her daughter.  Pt has 4 children, 1 son and 3 girls.  8 grandchildren and 2 on the way.  Pt was staying in an apartment for 12 years and due to changes in rent she was not able to keep her place due to SSI.      Occupation: None.  Job duties:  [employer].    Patient having difficulty with ADLs: transfers, sleeping.    Patient's goals are improve pain.    Patient reports general health as fair.  Related medical history concussion/dizziness, depression, mental illness, migraines/HA, numbness/tingling, rheumatoid arthritis, smoking, thyroid problems.    Surgical History:  None reported.    Imaging: none.    Medications:  Thyroid, anti-depressants.       Outcome measure:   Brittny Nicole  Return to MD:  As needed.       Clinical Impression: Estrella [facility] with primary complaint of chronic low back and neck pain.  Per clinical examination, pt demonstrates postural syndrome, decreased ROM, muscular strength and pain.  Extensive discussion with patient today regarding a regular routine of exercise for help with pain management.  Pt verbalized understanding with agreement to work towards a regular mobility and stability routine.  See plan of care outlined below.        HPI                 Objective:  Observation:   - Anterior pelvic tilt, forward trunk lean  - Incr lumbar lordosis   - Rounded shoulders, forward head        System         Lumbar/SI Evaluation  ROM:    AROM Lumbar:   Flexion:            50% pain across lower back   Ext:                    X10 reps, worsening upon repeated motion    Side Bend:        Left:  WFL    Right:  WFL  Rotation:           Left:  End range pain lumbar     Right:  End range pain lumbar   Side Glide:        Left:     Right:           Lumbar Myotomes:    T12-L3 (Hip Flex):  Left: 4+    Right: 4-  L2-4 (Quads):  Left:  4-    Right:  5  L4 (Ankle DF):  Left:  5    Right:  5  L5 (Great Toe Ext): Left: 5    Right: 5   S1 (Toe Raise):  Left: 5    Right: 5  Lumbar DTR's:  not assessed      Cord Signs:  not assessed    Lumbar Dermtomes:  normal                                                                       General     ROS    Assessment/Plan:    Patient is a 65 year old female with cervical and lumbar complaints.    Patient has the following significant findings with corresponding treatment plan.                Diagnosis 1:  LBP, neck pain    Pain -  hot/cold therapy, US, electric stimulation, manual therapy, self management, education and home program  Decreased ROM/flexibility - manual therapy and therapeutic exercise  Decreased strength - therapeutic exercise and therapeutic activities  Decreased function - therapeutic activities  Impaired posture - neuro re-education    Therapy  Evaluation Codes:   1) History comprised of:   Personal factors that impact the plan of care:      Coping style, Living environment, Past/current experiences, Profession, Social history/culture, Time since onset of symptoms and Work status.    Comorbidity factors that impact the plan of care are:      concussion/dizziness, depression, mental illness, migraines/HA, numbness/tingling, rheumatoid arthritis, smoking, thyroid problems.  .     Medications impacting care: Anti-depressant and thyroid.  2) Examination of Body Systems comprised of:   Body structures and functions that impact the plan of care:      Cervical spine and Lumbar spine.   Activity limitations that impact the plan of care are:      Bathing, Bending, Lifting, Sitting, Squatting/kneeling, Stairs, Standing and Walking.  3) Clinical presentation characteristics are:   Evolving/Changing.  4) Decision-Making    High complexity using standardized patient assessment instrument and/or measureable assessment of functional outcome.  Cumulative Therapy Evaluation is: High complexity.    Previous and current functional limitations:  (See Goal Flow Sheet for this information)    Short term and Long term goals: (See Goal Flow Sheet for this information)     Communication ability:  Patient appears to be able to clearly communicate and understand verbal and written communication and follow directions correctly.  Treatment Explanation - The following has been discussed with the patient:   RX ordered/plan of care  Anticipated outcomes  Possible risks and side effects  This patient would benefit from PT intervention to resume normal activities.   Rehab potential is good.    Frequency:  1 X week, once daily  Duration:  for 1 months tapering to 2 X a month over 1 months  Discharge Plan:  Achieve all LTG.  Independent in home treatment program.  Reach maximal therapeutic benefit.    Please refer to the daily flowsheet for treatment today, total treatment time and time  spent performing 1:1 timed codes.

## 2020-12-07 NOTE — LETTER
[facility] [place] [facility]  [address]  [address]  [place] [address]  [phone]    2020    Re: Estrella Arthur   :   1955  MRN:  [id number]   REFERRING PHYSICIAN:   Glory CURRY PT    Date of Initial Evaluation: 20  Visits:  Rxs Used: 1  Reason for Referral:     Chronic bilateral low back pain without sciatica  Neck pain    EVALUATION SUMMARY    [facility] Initial Evaluation  Physical Therapy Initial Examination/Evaluation    2020    Estrella Arthur  is a 65 year old  female referred to physical therapy by Glory Jarquin MD  for treatment of neck pain, low back pain, hip pain, dizziness.      DOI/onset ~ 4 weeks ago.      Mechanism of injury Chronic.  She has been staying at her daughters and sleeping on in air mattress.  I will also sleep on the couch which seems to help  DOS none  Prior treatment physical therapy. Effect of prior treatment good.      Chief Complaint:   Moving,  I sleep a lot because I don't want to get up.  The weather does not do my depression any justice.   Pain location: middle back and both of the shoulders.  I really just need a good back rub.  The left side is always worse than the right.    Quality: aching  Constant/Intermittent: constant  Time of day: evening  Symptoms have worsened since onset.    Current pain 7/10.  Pain at best 5/10.  Pain at worst 9/10.    Symptoms aggravated by standing (5 min), transfers in out of chair.  Reports no issues with cervical ROM, reading, driving.    Symptoms improved with laying down- faby pills ([facility]).     Social history:  Pt is currently living with her daughter.  Pt has 4 children, 1 son and 3 girls.  8 grandchildren and 2 on the way.  Pt was staying in an apartment for 12 years and due to changes in rent she was not able to keep her place due to SSI.      Occupation: None.  Job duties:  [employer].    Patient having difficulty with ADLs: transfers, sleeping.     Patient's goals are improve pain.          Patient reports general health as fair.  Related medical history concussion/dizziness, depression, mental illness, migraines/HA, numbness/tingling, rheumatoid arthritis, smoking, thyroid problems.    Surgical History:  None reported.    Imaging: none.    Medications:  Thyroid, anti-depressants.       Outcome measure:   Brittny Nicole  Return to MD:  As needed.      Clinical Impression: Estrella presents to [facility] with primary complaint of chronic low back and neck pain.  Per clinical examination, pt demonstrates postural syndrome, decreased ROM, muscular strength and pain.  Extensive discussion with patient today regarding a regular routine of exercise for help with pain management.  Pt verbalized understanding with agreement to work towards a regular mobility and stability routine.  See plan of care outlined below.        HPI                 Objective:  Observation:   - Anterior pelvic tilt, forward trunk lean  - Incr lumbar lordosis   - Rounded shoulders, forward head        System         Lumbar/SI Evaluation  ROM:    AROM Lumbar:   Flexion:            50% pain across lower back   Ext:                    X10 reps, worsening upon repeated motion    Side Bend:        Left:  WFL    Right:  WFL  Rotation:           Left:  End range pain lumbar     Right:  End range pain lumbar   Side Glide:        Left:     Right:           Lumbar Myotomes:    T12-L3 (Hip Flex):  Left: 4+    Right: 4-  L2-4 (Quads):  Left:  4-    Right:  5  L4 (Ankle DF):  Left:  5    Right:  5  L5 (Great Toe Ext): Left: 5    Right: 5   S1 (Toe Raise):  Left: 5    Right: 5  Lumbar DTR's:  not assessed    Cord Signs:  not assessed  Lumbar Dermtomes:  normal   Estrella Arthur   :   1955        Assessment/Plan:    Patient is a 65 year old female with cervical and lumbar complaints.    Patient has the following significant findings with corresponding treatment plan.                Diagnosis 1:  LBP,  neck pain    Pain -  hot/cold therapy, US, electric stimulation, manual therapy, self management, education and home program  Decreased ROM/flexibility - manual therapy and therapeutic exercise  Decreased strength - therapeutic exercise and therapeutic activities  Decreased function - therapeutic activities  Impaired posture - neuro re-education      Previous and current functional limitations:  (See Goal Flow Sheet for this information)    Short term and Long term goals: (See Goal Flow Sheet for this information)     Communication ability:  Patient appears to be able to clearly communicate and understand verbal and written communication and follow directions correctly.  Treatment Explanation - The following has been discussed with the patient:   RX ordered/plan of care  Anticipated outcomes  Possible risks and side effects  This patient would benefit from PT intervention to resume normal activities.   Rehab potential is good.    Frequency:  1 X week, once daily  Duration:  for 1 months tapering to 2 X a month over 1 months  Discharge Plan:  Achieve all LTG.  Independent in home treatment program.  Reach maximal therapeutic benefit.              Thank you for your referral.    INQUIRIES  Therapist: Eliza Bourne, PT, DPT, OCS  [facility] [place] [facility]  [address]  [address]  [place] [address]  Phone: [phone]  Fax: [phone]

## 2021-06-08 PROBLEM — M54.50 CHRONIC BILATERAL LOW BACK PAIN WITHOUT SCIATICA: Status: RESOLVED | Noted: 2020-12-07 | Resolved: 2021-06-08

## 2021-06-08 PROBLEM — G89.29 CHRONIC BILATERAL LOW BACK PAIN WITHOUT SCIATICA: Status: RESOLVED | Noted: 2020-12-07 | Resolved: 2021-06-08

## 2021-06-08 PROBLEM — M54.2 NECK PAIN: Status: RESOLVED | Noted: 2020-12-07 | Resolved: 2021-06-08

## 2022-03-23 ENCOUNTER — NURSE TRIAGE (OUTPATIENT)
Dept: FAMILY MEDICINE | Facility: CLINIC | Age: 67
End: 2022-03-23

## 2022-10-27 ENCOUNTER — TRANSCRIBE ORDERS (OUTPATIENT)
Dept: OTHER | Age: 67
End: 2022-10-27

## 2022-10-27 DIAGNOSIS — M25.511 ACUTE PAIN OF RIGHT SHOULDER: Primary | ICD-10-CM

## 2022-10-28 ENCOUNTER — THERAPY VISIT (OUTPATIENT)
Dept: PHYSICAL THERAPY | Facility: CLINIC | Age: 67
End: 2022-10-28
Attending: FAMILY MEDICINE
Payer: COMMERCIAL

## 2022-10-28 DIAGNOSIS — M25.511 ACUTE PAIN OF RIGHT SHOULDER: ICD-10-CM

## 2022-10-28 PROCEDURE — 97140 MANUAL THERAPY 1/> REGIONS: CPT | Mod: GP

## 2022-10-28 PROCEDURE — 97110 THERAPEUTIC EXERCISES: CPT | Mod: GP

## 2022-10-28 PROCEDURE — 97161 PT EVAL LOW COMPLEX 20 MIN: CPT | Mod: GP

## 2022-10-28 NOTE — PROGRESS NOTES
[facility]    OUTPATIENT Physical Therapy ORTHOPEDIC EVALUATION  PLAN OF TREATMENT FOR OUTPATIENT REHABILITATION  (COMPLETE FOR INITIAL CLAIMS ONLY)  Patient's Last Name, First Name, M.I.  YOB: 1955  Estrella Arthur    Provider s Name:  [facility]   Medical Record No.  [id number]   Start of Care Date:  10/28/22   Onset Date:   10/07/22   Treatment Diagnosis:  R shoulder pain Medical Diagnosis:  Acute pain of right shoulder       Goals:     10/28/22 0500   Body Part   Goals listed below are for R shoulder   Goal #1   Goal #1 posture/body mechanics   Previous Functional Level Patient reports poor posture and proper body mechanics   Current Functional Level Poor posture/body mechanics   Performance level Able to correct with mod cues, pain 6/10 at rest   STG Target Performance Patient able to demonstrate good posture and body mechanics when prompted   Performance Level Min cues 50% of the time, pain 2/10 at rest   Rationale to prevent neck/back pain and avoid injury when lifting/carrying and performing tasks requiring bending   Due Date 11/18/22   LTG Target Performance Patient able to demonstrate good posture and body mechanics without prompting   Performance Level 50% of the time, pain 0/10 at rest   Rationale to prevent neck/back pain and avoid injury when lifting/carrying and performing tasks requiring bending   Due Date 12/28/22       Therapy Frequency:  1x/week  Predicted Duration of Therapy Intervention:  x4 weeks, tapering to 2x/month x1 month    Jil Jha, PT                 I CERTIFY THE NEED FOR THESE SERVICES FURNISHED UNDER        THIS PLAN OF TREATMENT AND WHILE UNDER MY CARE .             Physician Signature               Date    X_____________________________________________________                         Certification Date From:  10/28/22    Certification Date To:  12/28/22    Referring Provider:  Sabrina Goff    Initial Assessment        See Epic Evaluation SOC Date: 10/28/22

## 2022-10-28 NOTE — PROGRESS NOTES
Physical Therapy Initial Examination/Evaluation  October 28, 2022    Esterlla Arthur is a 67 year old female referred to physical therapy by Sabrina Goff for treatment of Acute pain of right shoulder with Precautions/Restrictions/MD instructions eval and treat.     Therapist Impression:   Patient has complaints of R shoulder pain that has been ongoing for 3 weeks. She thought it was from sleeping but has been gradually getting worse.. She went into the clinic and was given some pain meds, the x-ray machine was down. Pain subsided but then got worse again, went to a different clinic and had an x-ray done and given muscle relaxants. PMH includes poor reaction to flu shot, ended up in [facility] via EMS due to inability to ambulate. She spent 12 nights in [facility] doing physical therapy, states she was deconditioned post. She also notes R carpal tunnel surgery which is why she donns a wrist brace and has difficulty picking up objects. Patient found to have impaired posture, impaired cervical ROM, decreased scapular control, painful MMT, and painful AC joint compression. Patient given HEP with scap retraction and sidelying ER.        Subjective:  DOI/onset: 3 weeks ago  Acute Injury or Gradual Onset?: Gradual injury over time  Mechanism of Injury: unknown  Related PMH: None    Imaging: x-ray - No fracture or dislocation seen.  Normal alignment at the glenohumeral joint.  Mild degenerative changes at the acromioclavicular joint without widening.  Visualized bones appear diffusely osteopenic.  No soft tissue calcification.  Chief Complaint/Functional Limitations:   R shoulder pain and see below in therapy evaluation codes   Pain: rest 6 /10, activity 7/10 Location: posterior R shoulder blade, down upper armFrequency: Constant Described as: aching Previous Treatment: Hot shower, Bengay Effect of prior treatment: good Alleviated by: Bengay Progression of Symptoms: Gradually getting better. Time of day when pain  "is worse: Activity related  Sleeping: Interrupted due to current issue and Losing 2-3 hours of sleep per night   Occupation: retired  Job duties: household chores   Current HEP/exercise regimen: None   Patient's goals are see chief complaints \"To get my R shoulder better\"    Other pertinent PMH/Red Flags: Depression, Osteoporosis, Thyroid Problems, wears R wrist brace   Barriers at home/work: Live alone  Pertinent Surgical History: DO carpal tunnel surgery  Medications: Anti-depressants  General health as reported by patient: fair  Return to MD:  PRN    SHOULDER EXAMINATION  Diagnosis: R shoulder pain    R handedness     CERVICAL SCREEN  AROM: Pain with R rotation and R SB     STATIC POSTURE  Forward head: mild   Rounded shoulders:moderate    DYNAMIC SCAPULAR TESTS  Dynamic Scapular Assessment: Scapular winging medial boarder DO and Delayed upward rotation on R     SHOULDER RANGE OF MOTION  AROM Flexion Abduction ER   Base Ext/IR Extension   Left WNL WNL WNL WNL WNL   Right WNL WNL WNL WNL WNL        SHOULDER STRENGTH  MMT Flexion Abduction ER IR   Left 5/5 5/5 5/5 5/5   Right 5-/5 pain 5-/5 pain 5/5 5/5     SPECIAL TESTS Left Right   Impingement Negative Negative  AC joint Negative Positive    PALPATION}  Right: Severe tenderness to palpation at R rhomboids and UT     Assessment/Plan:  Patient is a 67 year old female with right side shoulder complaints.    Patient has the following significant findings with corresponding treatment plan.                Diagnosis 1:  R shoulder pain  Pain -  hot/cold therapy, manual therapy and splint/taping/bracing/orthotics  Decreased strength - therapeutic exercise and therapeutic activities  Impaired muscle performance - neuro re-education  Decreased function - therapeutic activities  Impaired posture - neuro re-education    Therapy Evaluation Codes:   1) History comprised of:   Personal factors that impact the plan of care:      Overall behavior pattern.    Comorbidity factors " that impact the plan of care are:       Depression, Osteoporosis, Thyroid Problems, wears R wrist brace .     Medications impacting care: Anti-depressant.  2) Examination of Body Systems comprised of:   Body structures and functions that impact the plan of care:      Shoulder.   Activity limitations that impact the plan of care are:      Bathing, Cooking, Dressing, Lifting and Sleeping.  3) Clinical presentation characteristics are:   Stable/Uncomplicated.  4) Decision-Making    Low complexity using standardized patient assessment instrument and/or measureable assessment of functional outcome.  Cumulative Therapy Evaluation is: Low complexity.    Previous and current functional limitations:  (See Goal Flow Sheet for this information)    Short term and Long term goals: (See Goal Flow Sheet for this information)     Communication ability:  Patient appears to be able to clearly communicate and understand verbal and written communication and follow directions correctly.  Treatment Explanation - The following has been discussed with the patient:   RX ordered/plan of care  Anticipated outcomes  Possible risks and side effects  This patient would benefit from PT intervention to resume normal activities.   Rehab potential is good.    Frequency:  1 X week, once daily  Duration:  x4 weeks, tapering to 2x/month x1 month  Discharge Plan:  Achieve all LTG.  Independent in home treatment program.  Reach maximal therapeutic benefit.    Please refer to the daily flowsheet for treatment today, total treatment time and time spent performing 1:1 timed codes.

## 2022-11-10 ENCOUNTER — THERAPY VISIT (OUTPATIENT)
Dept: PHYSICAL THERAPY | Facility: CLINIC | Age: 67
End: 2022-11-10
Payer: COMMERCIAL

## 2022-11-10 DIAGNOSIS — M25.511 ACUTE PAIN OF RIGHT SHOULDER: Primary | ICD-10-CM

## 2022-11-10 PROCEDURE — 97110 THERAPEUTIC EXERCISES: CPT | Mod: GP

## 2022-11-10 PROCEDURE — 97140 MANUAL THERAPY 1/> REGIONS: CPT | Mod: GP

## 2022-11-17 ENCOUNTER — THERAPY VISIT (OUTPATIENT)
Dept: PHYSICAL THERAPY | Facility: CLINIC | Age: 67
End: 2022-11-17
Payer: COMMERCIAL

## 2022-11-17 DIAGNOSIS — M25.511 ACUTE PAIN OF RIGHT SHOULDER: Primary | ICD-10-CM

## 2022-11-17 PROCEDURE — 97110 THERAPEUTIC EXERCISES: CPT | Mod: GP

## 2023-03-08 PROBLEM — M25.511 ACUTE PAIN OF RIGHT SHOULDER: Status: RESOLVED | Noted: 2022-10-28 | Resolved: 2023-03-08

## 2023-03-08 NOTE — PROGRESS NOTES
Discharge Note    Progress reporting period is from initial evaluation date (please see noted date below) to Nov 17, 2022.  Linked Episodes   Type: Episode: Status: Noted: Resolved: Last update: Updated by:   PHYSICAL THERAPY R shoulder 10/28/22 Active 10/28/2022  11/17/2022  1:52 PM Jil Jha, PT      Comments:       Estrella failed to follow up and current status is unknown.  Please see information below for last relevant information on current status.  Patient seen for 3 visits.    SUBJECTIVE  Subjective changes noted by patient:  Estrella reports she got more pain pills which has helped as well as anxiety meds. Has some shoulder soreness. Has a pain management appt Monday. Rates herself at 20% better from first visit.  .  Current pain level is 2/10 (on 1/2 pain pill).     Previous pain level was  6/10.   Changes in function:  Yes (See Goal flowsheet attached for changes in current functional level)  Adverse reaction to treatment or activity: None    OBJECTIVE  Changes noted in objective findings: Improved seated posture. EXT/IR WNL with pain 5/10.Unable to demonstrate HEP, not completing at home.     ASSESSMENT/PLAN  Diagnosis: R shoulder pain   Updated problem list and treatment plan:   Pain - HEP  Decreased function - HEP  Decreased strength - HEP  STG/LTGs have been met or progress has been made towards goals:  Yes, please see goal flowsheet for most current information  Assessment of Progress: current status is unknown.    Last current status: Pt has not made progress   Self Management Plans:  HEP  I have re-evaluated this patient and find that the nature, scope, duration and intensity of the therapy is appropriate for the medical condition of the patient.  Estrella continues to require the following intervention to meet STG and LTG's:  HEP.    Recommendations:  Discharge with current home program.  Patient to follow up with MD as needed.    Please refer to the daily flowsheet for treatment today, total treatment  time and time spent performing 1:1 timed codes.

## 2023-04-16 ENCOUNTER — NURSE TRIAGE (OUTPATIENT)
Dept: NURSING | Facility: CLINIC | Age: 68
End: 2023-04-16
Payer: COMMERCIAL

## 2023-04-17 NOTE — TELEPHONE ENCOUNTER
Patient calling reports getting up to use the bathroom, scraping the knee and falling, possibly spraining the ankle. Reports having increased pain with walking but is able to bear weight. Denies bleeding, broken bone and severe pain. Advised per protocol to see a provider within 24 hours with patient agreeable to the plan.     Cailin Byers RN 04/16/23 9:08 PM    Health Triage Nurse Advisor      Reason for Disposition    [1] Limp when walking AND [2] due to a twisted ankle or foot    Additional Information    Negative: Serious injury with multiple fractures (broken bones)    Negative: [1] Major bleeding (e.g., actively dripping or spurting) AND [2] can't be stopped    Negative: Sounds like a life-threatening emergency to the triager    Negative: Wound looks infected    Negative: Arm pain from overuse (e.g., sports, lifting, physical work)    Negative: Arm pain not from an injury    Negative: Shoulder injury is main concern    Negative: Elbow injury is main concern    Negative: Wrist or hand injury is main concern    Negative: Finger injury is main concern    Negative: Bullet wound, stabbed by knife, or other serious penetrating wound    Negative: Looks like a broken bone (crooked or deformed)    Negative: Looks like a dislocated joint    Negative: Serious injury with multiple fractures (broken bones)    Negative: [1] Major bleeding (e.g., actively dripping or spurting) AND [2] can't be stopped    Negative: Amputation    Negative: Looks like a dislocated joint (very crooked or deformed)    Negative: Sounds like a life-threatening emergency to the triager    Negative: Wound looks infected    Negative: Caused by an animal bite    Negative: Caused by a human bite    Negative: Puncture wound of foot    Negative: Toe injury is main concern    Negative: Cast problems or questions    Negative: [1] Bleeding AND [2] won't stop after 10 minutes of direct pressure (using correct technique)    Negative: Skin is split open or  gaping (or length > 1/2 inch or 12 mm)    Negative: Bullet wound, stabbed by knife, or other serious penetrating wound    Negative: [1] Dirt in the wound AND [2] not removed with 15 minutes of scrubbing    Negative: Can't stand (bear weight) or walk    Negative: [1] Numbness (new loss of sensation) of toe(s) AND [2] present now    Negative: Sounds like a serious injury to the triager    Negative: [1] SEVERE pain AND [2] not improved 2 hours after pain medicine/ice packs    Negative: Suspicious history for the injury    Protocols used: ANKLE AND FOOT INJURY-A-AH, ARM INJURY-A-AH

## 2023-04-23 ENCOUNTER — HOSPITAL ENCOUNTER (EMERGENCY)
Facility: CLINIC | Age: 68
Discharge: HOME | End: 2023-04-23
Attending: EMERGENCY MEDICINE | Admitting: EMERGENCY MEDICINE
Payer: COMMERCIAL

## 2023-04-23 VITALS
HEART RATE: 69 BPM | WEIGHT: 150 LBS | DIASTOLIC BLOOD PRESSURE: 70 MMHG | OXYGEN SATURATION: 93 % | SYSTOLIC BLOOD PRESSURE: 136 MMHG | BODY MASS INDEX: 26.58 KG/M2 | RESPIRATION RATE: 18 BRPM | TEMPERATURE: 98 F | HEIGHT: 63 IN

## 2023-04-23 DIAGNOSIS — S82.892A ANKLE FRACTURE, LEFT, CLOSED, INITIAL ENCOUNTER: ICD-10-CM

## 2023-04-23 PROCEDURE — 250N000011 HC RX IP 250 OP 636: Performed by: EMERGENCY MEDICINE

## 2023-04-23 PROCEDURE — 99285 EMERGENCY DEPT VISIT HI MDM: CPT

## 2023-04-23 PROCEDURE — 96372 THER/PROPH/DIAG INJ SC/IM: CPT | Performed by: EMERGENCY MEDICINE

## 2023-04-23 RX ORDER — OXYCODONE HYDROCHLORIDE 5 MG/1
5 TABLET ORAL EVERY 6 HOURS PRN
Qty: 10 TABLET | Refills: 0 | Status: SHIPPED | OUTPATIENT
Start: 2023-04-23 | End: 2023-04-26

## 2023-04-23 RX ADMIN — HYDROMORPHONE HYDROCHLORIDE 1 MG: 1 INJECTION, SOLUTION INTRAMUSCULAR; INTRAVENOUS; SUBCUTANEOUS at 21:08

## 2023-04-23 ASSESSMENT — ACTIVITIES OF DAILY LIVING (ADL): ADLS_ACUITY_SCORE: 35

## 2023-04-24 NOTE — ED TRIAGE NOTES
"Pt presents to the ED with c/o right ankle pain. Pt broke right ankle a week ago and was supposed to follow up with ortho tomorrow but \"ran out of pain meds\" and cant make it to [facility] tomorrow. Pt hx of COPD and did come in with oxygen sats 90-93% while on RA. Denies any SOB, cough, or CP.       "

## 2023-04-24 NOTE — ED PROVIDER NOTES
EMERGENCY DEPARTMENT ENCOUNTER      NAME: Estrella Arthur  AGE: 67 year old female  YOB: 1955  MRN: [id number]  EVALUATION DATE & TIME: 2023  8:29 PM    PCP: Lexy Cha    ED PROVIDER: Omero Liu D.O.      Chief Complaint   Patient presents with     Ankle Pain       FINAL IMPRESSION:  1. Ankle fracture, left, closed, initial encounter        ED COURSE & MEDICAL DECISION MAKIN:48 PM I met with the patient to gather history and to perform my initial exam. I discussed the plan for care while in the Emergency Department.         Pertinent Labs & Imaging studies reviewed. (See chart for details)  67 year old female presents to the Emergency Department for evaluation of left ankle pain.  Patient does have known ankle fracture, but did run out of her pain medication tonight.  The patient does appear neurovascular intact distally.  I do not believe she requires new x-rays as she has no new trauma.  There is no clinical concern for compartment syndrome based on her exam.  At this time, do plan on discharge with pain control and have follow-up as an outpatient with primary care provider and [facility] surgery team.  Patient was agreeable with this plan.  Return precautions discussed.    Medical Decision Making    History:    Supplemental history from: N/A    External Record(s) reviewed: Documented in chart, if applicable.    Work Up:    Chart documentation includes differential considered and any EKGs or imaging independently interpreted by provider, where specified.    In additional to work up documented, I considered the following work up: Documented in chart, if applicable.    External consultation:    Discussion of management with another provider: Documented in chart, if applicable    Complicating factors:    Care impacted by chronic illness: Chronic Lung Disease    Care affected by social determinants of health: N/A    Disposition considerations: Discharge. I prescribed additional prescription  strength medication(s) as charted. N/A.        At the conclusion of the encounter I discussed the results of all of the tests and the disposition. The questions were answered. The patient or family acknowledged understanding and was agreeable with the care plan.      HPI    Patient information was obtained from: Patient    Use of : N/A     Estrella Arthur is a 67 year old female who presents for evaluation of right ankle pain.    The patient reports she was diagnosed with a right ankle fracture last week and placed in a temporary splint. She is scheduled to see [facility] tomorrow for follow-up. She is presenting to the ED today due to persistent pain in her right ankle and being out of pain medication at home. She is currently ambulating with a walker and cane at home.    Per Chart Review: Patient diagnosed with nondisplaced fracture of the lateral malleolus of the right fibula on 4/17/23 at [facility]. Placed in a splint and provided with walker. Prescribed short course of percocet. Followed up with PCP on 4/20/23, referral placed to orthopedics and an additional 20 pills percocet prescribed.      REVIEW OF SYSTEMS  Constitutional:  Denies fever, chills, weight loss or weakness  Eyes:  No pain, discharge, redness  HENT:  Denies sore throat, ear pain, congestion  Respiratory: No SOB, wheeze or cough  Cardiovascular:  No CP, palpitations  GI:  Denies abdominal pain, nausea, vomiting, diarrhea  : Denies dysuria, hematuria  Musculoskeletal:  Denies any new muscle pain, swelling or loss of function. Endorses right ankle pain.  Skin:  Denies rash, pallor  Neurologic:  Denies headache, focal weakness or sensory changes  Lymph: Denies swollen nodes    All other systems negative unless noted in HPI.    PAST MEDICAL HISTORY:  Past Medical History:   Diagnosis Date     ACQUIRED HYPOTHYROID NEC 2/20/2008     ANXIETY STATE NOS 2/20/2008     child birth     3 preg, 1 set of twins.      DEPRESSIVE DISORDER NEC 2/20/2008      Drug abuse and dependence (H) 2/15/2009     Gastro-oesophageal reflux disease      Hypercalcaemia 2/15/2009     INSOMNIA NEC 2/20/2008     MALIG NEOPLASM SKIN TRUNK 2/20/2008     MIGRAINE NOS W/O MENTN INTRACTABLE 2/20/2008     OSTEOPOROSIS NOS 2/20/2008     Superficial foreign body buttock no major open wound or infection      Vitamin D deficiency 8/31/2009       PAST SURGICAL HISTORY:  Past Surgical History:   Procedure Laterality Date     CL AFF SURGICAL PATHOLOGY       COLECTOMY SUBTOTAL       EXCISE LESION BUTTOCK(S)  1/18/2013    Procedure: EXCISE LESION BUTTOCK(S);  exploration of left buttocks with c-arm;  Surgeon: Lico Holder MD;  Location:  OR     [facility] APPENDECTOMY           CURRENT MEDICATIONS:    Current Facility-Administered Medications   Medication     HYDROmorphone (DILAUDID) injection 1 mg     Current Outpatient Medications   Medication     oxyCODONE (ROXICODONE) 5 MG tablet     clonazePAM (KLONOPIN) 0.5 MG tablet     FLUoxetine (PROZAC) 20 MG capsule     IRON CR PO     levothyroxine (LEVOXYL) 125 MCG tablet     oxyCODONE-acetaminophen (PERCOCET) 5-325 MG per tablet     oxyCODONE-acetaminophen (PERCOCET) 5-325 MG per tablet     psyllium (METAMUCIL MULTIHEALTH FIBER) 58.6 % POWD     SUMAtriptan Succinate 6 MG/0.5ML HARIKA     zolpidem (AMBIEN) 10 MG tablet         ALLERGIES:  Allergies   Allergen Reactions     Codeine      Headache and nausea     Tramadol Rash       FAMILY HISTORY:  Family History   Problem Relation Age of Onset     Obesity Mother      Osteoporosis Mother      Osteoporosis Maternal Grandmother      Alcohol/Drug Father      Cancer Brother      Obesity Brother      Depression Sister        SOCIAL HISTORY:  Social History     Socioeconomic History     Marital status:    Tobacco Use     Smoking status: Every Day     Packs/day: 0.50     Types: Cigarettes     Tobacco comments:     3/4 pack a day   Substance and Sexual Activity     Alcohol use: No     Drug use: No      "Sexual activity: Never   Social History Narrative    Dairy/d 3-4 servings/d.     Caffeine 3-4 servings/d    Exercise 7 x week, pt walks the stairs at her apt    Sunscreen used - No    Seatbelts used - Yes    Working smoke/CO detectors in the home - Yes    Guns stored in the home - No    Self Breast Exams - Yes    Self Testicular Exam - NA    Eye Exam up to date - Yes, 2006    Dental Exam up to date - NA, pt has false teeth    Pap Smear up to date - Yes, today    Mammogram up to date - Yes, today    PSA up to date - NA    Dexa Scan up to date - Yes ? On the date    Flex Sig / Colonoscopy up to date - Yes, 2005    Immunizations up to date - Yes, last DT 10 yrs.    Abuse: Current or Past(Physical, Sexual or Emotional)- No    Do you feel safe in your environment - Yes    PAULA Sun CMA                   VITALS:  Patient Vitals for the past 24 hrs:   BP Temp Temp src Pulse Resp SpO2 Height Weight   04/23/23 2030 136/70 98  F (36.7  C) Temporal 63 18 90 % 1.6 m (5' 3\") 68 kg (150 lb)       PHYSICAL EXAM    VITAL SIGNS: /70   Pulse 63   Temp 98  F (36.7  C) (Temporal)   Resp 18   Ht 1.6 m (5' 3\")   Wt 68 kg (150 lb)   SpO2 90%   BMI 26.57 kg/m      General: Appears in no acute distress, awake, alert, interactive.  Eyes: Conjunctivae non-injected. Sclera anicteric.  HENT: Atraumatic, normocephalic  Neck: Supple, normal ROM  Respiratory/Chest: Respiration unlabored, no tachypnea  Abdomen: non distended  Musculoskeletal: Normal extremities. No edema or erythema. Splint on the right ankle. Right lower extremity neurovascularly intact. Bruising to th toes of the right foot.  Skin: Normal color. No rash or diaphoresis.  Neurologic: Alert and oriented, face symmetric, moves all extremities spontaneously. Speech clear.  Psychiatric:  Affect normal, Judgment normal, Mood normal.      MEDICATIONS GIVEN IN THE EMERGENCY:  Medications   HYDROmorphone (DILAUDID) injection 1 mg (has no administration in time range)       NEW " PRESCRIPTIONS STARTED AT TODAY'S ER VISIT  New Prescriptions    OXYCODONE (ROXICODONE) 5 MG TABLET    Take 1 tablet (5 mg) by mouth every 6 hours as needed for pain        I, Neto Connors, am serving as a scribe to document services personally performed by Omero Liu D.O., based on my observations and the provider's statements to me.  I, Omero Liu D.O., attest that Neto Connors is acting in a scribe capacity, has observed my performance of the services and has documented them in accordance with my direction.     Omero Liu D.O.  Emergency Medicine  [facility] EMERGENCY ROOM  [facility] [id number] [phone]  Dept: [phone]     Omero Liu,   04/23/23 [id number]

## 2025-07-17 NOTE — PROGRESS NOTES
90 day supply of metformin and rosuvastatin e-scribed to [facility] market pharmacy per protocol.   Refills cancelled at [facility].  Left message to inform Tess that this has been done.    Discharge Note    Progress reporting period is from initial eval to Jan 23, 2018.     Estrella failed to return for next follow up visit and current status is unknown.  Please see information below for last relevant information on current status.  Patient seen for Rxs Used: 3 visits.  SUBJECTIVE  Subjective changes noted by patient:  Subjective: Pt reports the pain is about the same since last visit.  Has been performing HEP 3 x daily and states that it feels good to stretch but overall pain is the same.    .  Current pain level is Current Pain level: 6/10.     Previous pain level was  Initial Pain level: 8/10.   Changes in function:  Yes (See Goal flowsheet attached for changes in current functional level)  Adverse reaction to treatment or activity: None    OBJECTIVE  Changes noted in objective findings: Objective: See ROM below     ASSESSMENT/PLAN  Diagnosis: LBP   DIAGP:  The encounter diagnosis was Chronic bilateral low back pain with left-sided sciatica.  Updated problem list and treatment plan:   Pain - HEP  Decreased ROM/flexibility - HEP  Decreased function - HEP  Decreased strength - HEP  Impaired posture - HEP  Decreased joint mobility - HEP  STG/LTGs have been met or progress has been made towards goals:  Yes, please see goal flowsheet for most current information  Assessment of Progress: current status is unknown.  Last current status: Assessment of progress: Pt has not made progress   Self Management Plans:  HEP  I have re-evaluated this patient and find that the nature, scope, duration and intensity of the therapy is appropriate for the medical condition of the patient.  Estrella continues to require the following intervention to meet STG and LTG's:  HEP.    Recommendations:  Discharge with current home program.  Patient to follow up with MD as needed.    Please refer to the daily flowsheet for treatment today, total treatment time and time spent performing 1:1 timed codes.